# Patient Record
(demographics unavailable — no encounter records)

---

## 2025-05-29 NOTE — HISTORY OF PRESENT ILLNESS
[FreeTextEntry1] : Patient is a 45-year-old female who presents to the cardiology clinic to establish care.  Last year patient started to have symptoms of dizziness, initially diagnosed with vertigo which was ruled out by extensive testing with ENT.  She has been seeing multiple specialist including pain doctor as her dizziness is now thought to be coming from her cervical spine.  She denies any presyncope or syncope.  She has seen pain management for her neck as well as chiropractor.  Recently her chiropractor did some maneuvers which has helped her symptoms some.  She does endorse some stiffness in her hands and feet.  She denies any rashes or rheumatologic condition in the family.  She does have shortness of breath when she climbs a flight of stairs but otherwise denies any chest pain, lower extremity swelling, orthopnea or PND.  She denies any prior cardiac issues.

## 2025-05-29 NOTE — REVIEW OF SYSTEMS
[Fever] : no fever [Headache] : headache [Chills] : no chills [Feeling Fatigued] : not feeling fatigued [Blurry Vision] : blurred vision [Earache] : no earache [Hearing Loss] : no hearing loss [Vertigo] : no vertigo [Dyspnea on exertion] : dyspnea during exertion [Lower Ext Edema] : no extremity edema [Palpitations] : no palpitations [Orthopnea] : no orthopnea [PND] : no PND [Syncope] : no syncope [Cough] : no cough [Wheezing] : no wheezing [Abdominal Pain] : no abdominal pain [Joint Pain] : no joint pain [Joint Stiffness] : joint stiffness [Myalgia] : no myalgia [Rash] : no rash [Dizziness] : dizziness [Easy Bleeding] : no tendency for easy bleeding

## 2025-05-29 NOTE — REASON FOR VISIT
[FreeTextEntry1] : Establish care for shortness of breath on climbing stairs [FreeTextEntry3] : Dr Mildred Miller

## 2025-05-29 NOTE — CARDIOLOGY SUMMARY
[de-identified] : May 2025 Normal sinus rhythm, no ischemia or infarction, right ventricular conduction delay

## 2025-05-29 NOTE — RESULTS/DATA
[TextEntry] : Labs performed in May 2025 Total cholesterol 183 HDL 46  Glucose 86 Sodium 139 Potassium 4.2 Chloride 107 Bicarb 24 Total protein 7.3 Albumin 4.3 AST 16 ALT 19 Hemoglobin A1c 4.9 TSH 1.44  Labs in February 2025 Hemoglobin 13.6 Anti-SSA antibodies more than 8 Anti-SSB antibodies more than 1 Rheumatoid factor 39 Anti-double-stranded DNA 26

## 2025-05-29 NOTE — ASSESSMENT
[FreeTextEntry1] : Patient is a 45-year-old female with history of dizziness, cervical spine issues and abnormal rheumatologic tests who presents to the cardiology clinic for evaluation of shortness of breath.  At this time it appears that she may be having some rheumatologic condition that is causing all her symptoms, will rule out cardiac etiology for dyspnea by getting an echocardiogram and ruling out structural and valvular heart disease.  Dyspnea on exertion Patient is euvolemic on exam, electrocardiogram reviewed.  Will get an echocardiogram to rule out structural heart disease.  Patient is otherwise okay from a cardiovascular standpoint  Hyperlipidemia Patient is working on diet modification, LDL was elevated.  No significant family history of heart disease  Abnormal rheumatologic tests and neck pain with dizziness Patient has a scheduled appointment with rheumatologist in June 2025  Patient will see me back in clinic in 1 month with an echocardiogram, sooner if needed

## 2025-06-06 NOTE — HISTORY OF PRESENT ILLNESS
[FreeTextEntry1] : 44 y/o female referred to rheumatology for Sjogren's. Pt went to the hospital in 9/2024 for dizziness and vomiting Pt was referred to neurology for possible vertigo. Pt was seen by ENT in 12/2024 for L ear pain which was deemed to be related to cervical disease and referred to pain management. Pt underwent evaluation for dizziness with ENT including ENG. vestibular therapy. Pt developed episode of neck pain shooting up head with residual some tingling and neck soreness. Pt has received epidural injections. Pt reports poor sleep, irregular menstural cycle since birth of her daughter Pt has tried duloxetine but stopped deut to nausea.  Pt was seen by Dr. Medina in 10/2024 then Dr. Goodman, last in 5/30/25 (1 week prior to pt's initial visit with me). Workup showed JOSHUA++, SSA++, dsDNA+, with prior CT showing asymmetric parotid glands evidence of possible prior inflammation. XR reportedly showed mild sacroiliitis. Pt was diagnosed with possible Sjogren's/neuro-Sjogren's, UCTD and advised on starting HCQ 200mg PO BID on 5/2025. Dr. Goodman also considering possible IVIG in the future.  WORKUP: Remarkable for: JOSHUA 1:640, SSA++, dsDNA 26, cardiolipin IgM 15. RF 35.4 Normal/neg: CBC, CMP, ESR, CRP, SSB, Batres, RNP, histone Ab, C3, C4, Thyroid Ab, SCl-70, centromere Ab, RNA Polymerase III, Shivani-1, gliadin Ab, HLA-B27, ACE level, IgG4 subclasses, SPEP, TSHJ, thyroid Ab, RPR, Lyme

## 2025-06-06 NOTE — ASSESSMENT
[FreeTextEntry1] : 46 y/o female presents for second opinion for Sjogren's. Pt went to the hospital in 9/2024 for dizziness and vomiting Pt was referred to neurology for possible vertigo. Pt was seen by ENT in 12/2024 for L ear pain which was deemed to be related to cervical disease and referred to pain management. Pt underwent evaluation for dizziness with ENT including ENG. vestibular therapy. Pt developed episode of neck pain shooting up head with residual some tingling and neck soreness. Pt has received epidural injections. Pt reports poor sleep, irregular menstural cycle since birth of her daughter Pt has tried duloxetine but stopped deut to nausea.  Pt was seen by Dr. Medina in 10/2024 then Dr. Goodman, last in 5/30/25 (1 week prior to pt's initial visit with me). Workup showed JOSHUA++, SSA++, dsDNA+, with prior CT showing asymmetric parotid glands evidence of possible prior inflammation. XR reportedly showed mild sacroiliitis. Pt was diagnosed with possible Sjogren's/neuro-Sjogren's, UCTD and advised on starting HCQ 200mg PO BID on 5/2025. Dr. Goodman also considering possible IVIG in the future.  I reviewed patient's extensive records and discussed with patient further to obtain further details. I agree with pt's diagnosis of Sjogren's in setting of JOSHUA++, SSA++, dsDNA borderline, RF+. Pt's joint pains of hands and feet may possibly be related to Sjogren's and parotid gland swelling seen on CT scan is likely related. Pt's neurologic symptoms can be seen in neuro-Sjogren's, but this is less clear since this is much rarer and less specific. Pt's neck pain is likely mechanical and unlikely related to Sjogren's. I also agree with trial of HCQ for treatment of the above aspects of Sjogren's involvement. IVIG can be considered in future, as per Dr. Goodman's notes, if there are concerns for neuro involvement of Sjogren's which is very difficult to prove. Pt should continue follow up with neuro and pain management for evaluation and treatment of neck pains which are unrelated.  Given above, I advised patient to continue to follow up with Dr. Goodman. Pt understood and grateful for the counseling. RTC PRN

## 2025-07-07 NOTE — REASON FOR VISIT
[FreeTextEntry3] : Dr Mildred Miller [FreeTextEntry1] : Establish care for shortness of breath on climbing stairs

## 2025-07-07 NOTE — ASSESSMENT
[FreeTextEntry1] : Patient is a 45-year-old female with history of dizziness, cervical spine issues and abnormal rheumatologic tests who presents to the cardiology clinic for evaluation of shortness of breath.  At this time it appears that she may be having some rheumatologic condition that is causing all her symptoms, will rule out cardiac etiology for dyspnea by getting an echocardiogram and ruling out structural and valvular heart disease.  Echo showed normal heart function and valves. At this time, her symptoms may be secondary to rheumatolical condition.  Dyspnea on exertion Patient is euvolemic on exam, electrocardiogram and echocardiogram reviewed.  Continue to monitor as she starts treatment for possible Sjogrens syndrome.  Hyperlipidemia Patient is working on diet modification, LDL was elevated.  No significant family history of heart disease  Dizziness Likely orthostatic hypotension, discussed increased fluid intake, patient verbalized understanding.  Patient will see me back in clinic in 6 months with an echocardiogram, sooner if needed

## 2025-07-07 NOTE — CARDIOLOGY SUMMARY
[de-identified] : May 2025 Normal sinus rhythm, no ischemia or infarction, right ventricular conduction delay [de-identified] : 7/2025 1. Left ventricular cavity is normal in size. Left ventricular wall thickness is normal. Left ventricular systolic function is normal with an ejection fraction visually estimated at 60 to 65 %. 2. Normal left ventricular diastolic function. 3. Trace mitral regurgitation. 4. Mild tricuspid regurgitation. 5. Estimated pulmonary artery systolic pressure is 26 mmHg. 6. Trace pulmonic regurgitation. 7. No pericardial effusion seen. 8. No prior echocardiogram is available for comparison.